# Patient Record
Sex: FEMALE | Race: BLACK OR AFRICAN AMERICAN | NOT HISPANIC OR LATINO | Employment: FULL TIME | URBAN - METROPOLITAN AREA
[De-identification: names, ages, dates, MRNs, and addresses within clinical notes are randomized per-mention and may not be internally consistent; named-entity substitution may affect disease eponyms.]

---

## 2024-06-14 ENCOUNTER — LAB VISIT (OUTPATIENT)
Dept: LAB | Facility: HOSPITAL | Age: 24
End: 2024-06-14
Attending: INTERNAL MEDICINE
Payer: COMMERCIAL

## 2024-06-14 ENCOUNTER — PATIENT MESSAGE (OUTPATIENT)
Dept: RHEUMATOLOGY | Facility: CLINIC | Age: 24
End: 2024-06-14

## 2024-06-14 ENCOUNTER — OFFICE VISIT (OUTPATIENT)
Dept: RHEUMATOLOGY | Facility: CLINIC | Age: 24
End: 2024-06-14
Payer: COMMERCIAL

## 2024-06-14 VITALS
BODY MASS INDEX: 47.7 KG/M2 | DIASTOLIC BLOOD PRESSURE: 73 MMHG | HEIGHT: 61 IN | HEART RATE: 69 BPM | WEIGHT: 252.63 LBS | SYSTOLIC BLOOD PRESSURE: 105 MMHG

## 2024-06-14 DIAGNOSIS — R21 RASH: Primary | ICD-10-CM

## 2024-06-14 DIAGNOSIS — R21 RASH: ICD-10-CM

## 2024-06-14 DIAGNOSIS — R76.8 POSITIVE ANA (ANTINUCLEAR ANTIBODY): ICD-10-CM

## 2024-06-14 LAB
ALBUMIN SERPL BCP-MCNC: 3.7 G/DL (ref 3.5–5.2)
ALP SERPL-CCNC: 90 U/L (ref 55–135)
ALT SERPL W/O P-5'-P-CCNC: 10 U/L (ref 10–44)
ANION GAP SERPL CALC-SCNC: 9 MMOL/L (ref 8–16)
AST SERPL-CCNC: 16 U/L (ref 10–40)
BASOPHILS # BLD AUTO: 0.05 K/UL (ref 0–0.2)
BASOPHILS NFR BLD: 0.8 % (ref 0–1.9)
BILIRUB SERPL-MCNC: 0.8 MG/DL (ref 0.1–1)
BUN SERPL-MCNC: 8 MG/DL (ref 6–20)
C3 SERPL-MCNC: 177 MG/DL (ref 50–180)
C4 SERPL-MCNC: 37 MG/DL (ref 11–44)
CALCIUM SERPL-MCNC: 9.2 MG/DL (ref 8.7–10.5)
CHLORIDE SERPL-SCNC: 106 MMOL/L (ref 95–110)
CO2 SERPL-SCNC: 23 MMOL/L (ref 23–29)
CREAT SERPL-MCNC: 0.8 MG/DL (ref 0.5–1.4)
DIFFERENTIAL METHOD BLD: NORMAL
EOSINOPHIL # BLD AUTO: 0.1 K/UL (ref 0–0.5)
EOSINOPHIL NFR BLD: 0.9 % (ref 0–8)
ERYTHROCYTE [DISTWIDTH] IN BLOOD BY AUTOMATED COUNT: 13.6 % (ref 11.5–14.5)
EST. GFR  (NO RACE VARIABLE): >60 ML/MIN/1.73 M^2
GLUCOSE SERPL-MCNC: 84 MG/DL (ref 70–110)
HCT VFR BLD AUTO: 37.9 % (ref 37–48.5)
HGB BLD-MCNC: 12.7 G/DL (ref 12–16)
IGE SERPL-ACNC: 58 IU/ML (ref 0–100)
IMM GRANULOCYTES # BLD AUTO: 0.02 K/UL (ref 0–0.04)
IMM GRANULOCYTES NFR BLD AUTO: 0.3 % (ref 0–0.5)
LYMPHOCYTES # BLD AUTO: 1.4 K/UL (ref 1–4.8)
LYMPHOCYTES NFR BLD: 22.6 % (ref 18–48)
MCH RBC QN AUTO: 27.7 PG (ref 27–31)
MCHC RBC AUTO-ENTMCNC: 33.5 G/DL (ref 32–36)
MCV RBC AUTO: 83 FL (ref 82–98)
MONOCYTES # BLD AUTO: 0.5 K/UL (ref 0.3–1)
MONOCYTES NFR BLD: 8 % (ref 4–15)
NEUTROPHILS # BLD AUTO: 4.3 K/UL (ref 1.8–7.7)
NEUTROPHILS NFR BLD: 67.4 % (ref 38–73)
NRBC BLD-RTO: 0 /100 WBC
PLATELET # BLD AUTO: 420 K/UL (ref 150–450)
PMV BLD AUTO: 10.1 FL (ref 9.2–12.9)
POTASSIUM SERPL-SCNC: 3.8 MMOL/L (ref 3.5–5.1)
PROT SERPL-MCNC: 7.4 G/DL (ref 6–8.4)
RBC # BLD AUTO: 4.58 M/UL (ref 4–5.4)
SODIUM SERPL-SCNC: 138 MMOL/L (ref 136–145)
WBC # BLD AUTO: 6.38 K/UL (ref 3.9–12.7)

## 2024-06-14 PROCEDURE — 86162 COMPLEMENT TOTAL (CH50): CPT | Performed by: INTERNAL MEDICINE

## 2024-06-14 PROCEDURE — 86036 ANCA SCREEN EACH ANTIBODY: CPT | Performed by: INTERNAL MEDICINE

## 2024-06-14 PROCEDURE — 3008F BODY MASS INDEX DOCD: CPT | Mod: CPTII,S$GLB,, | Performed by: INTERNAL MEDICINE

## 2024-06-14 PROCEDURE — 82785 ASSAY OF IGE: CPT | Performed by: INTERNAL MEDICINE

## 2024-06-14 PROCEDURE — 86160 COMPLEMENT ANTIGEN: CPT | Performed by: INTERNAL MEDICINE

## 2024-06-14 PROCEDURE — 86038 ANTINUCLEAR ANTIBODIES: CPT | Performed by: INTERNAL MEDICINE

## 2024-06-14 PROCEDURE — 85025 COMPLETE CBC W/AUTO DIFF WBC: CPT | Performed by: INTERNAL MEDICINE

## 2024-06-14 PROCEDURE — 3074F SYST BP LT 130 MM HG: CPT | Mod: CPTII,S$GLB,, | Performed by: INTERNAL MEDICINE

## 2024-06-14 PROCEDURE — 1159F MED LIST DOCD IN RCRD: CPT | Mod: CPTII,S$GLB,, | Performed by: INTERNAL MEDICINE

## 2024-06-14 PROCEDURE — 86160 COMPLEMENT ANTIGEN: CPT | Mod: 59 | Performed by: INTERNAL MEDICINE

## 2024-06-14 PROCEDURE — 1160F RVW MEDS BY RX/DR IN RCRD: CPT | Mod: CPTII,S$GLB,, | Performed by: INTERNAL MEDICINE

## 2024-06-14 PROCEDURE — 80053 COMPREHEN METABOLIC PANEL: CPT | Performed by: INTERNAL MEDICINE

## 2024-06-14 PROCEDURE — 99999 PR PBB SHADOW E&M-NEW PATIENT-LVL III: CPT | Mod: PBBFAC,,, | Performed by: INTERNAL MEDICINE

## 2024-06-14 PROCEDURE — 99204 OFFICE O/P NEW MOD 45 MIN: CPT | Mod: S$GLB,,, | Performed by: INTERNAL MEDICINE

## 2024-06-14 PROCEDURE — 3078F DIAST BP <80 MM HG: CPT | Mod: CPTII,S$GLB,, | Performed by: INTERNAL MEDICINE

## 2024-06-16 NOTE — PROGRESS NOTES
History of present illness:  23-year-old female has a 7 year history of rash.  It is been primarily on the back of the neck and her chest.  It is also been on her arms.  She describes it as raised macules.  It is pruritic.  It may last up to a month at a time.  She has been treated with light therapy and topical medications.  The rash is worse when she has in the sun.  She has never had a skin biopsy.  Most recently she was found to have a positive GEOVANNI and is referred for that reason.    She has had no unexplained fevers.  She complains of bifrontal headache.  She has had no conjunctivitis, oral ulcers, dry eye or mouth, Raynaud's phenomena.  She is occasional anterior pleuritic chest pain.  She has no chronic or bloody diarrhea.  She complains of chronic vaginal discharge.  She has had no vaginal ulcers.  She has no joint pain or arthritis, numbness or tingling, thrombophlebitis.  She has never been pregnant.  She has no family history of connective tissue disease.    Systems review:  General:  She is lost 14 lb on diet   Respiratory:  No chronic cough or shortness of breath.  She has a history of asthma and occasional wheezing.  She uses an inhaler but has been on no preventative medication.  GI:  No abdominal pain or peptic ulcer disease.  No liver problems.  :  No kidney or bladder problems    Physical examination:  Skin:  She has no rash at this time.  (There are pictures of her previous rash in media).  ENT:  Adequate tears in saliva.  No conjunctivitis or oral ulcers.  Chest: Clear to auscultation  Cardiac:  No murmurs, gallops, rubs   Abdomen:  No organomegaly or masses.  No tenderness to palpation   Extremities:  No pedal edema  Musculoskeletal:  Full range of motion of all joints.  No synovitis.  Neurologic:  Normal muscle strength testing   Laboratory: GEOVANNI positive 1:40, cytoplasmic pattern.  This is considered negative in most laboratory.  Her other laboratory studies were negative.       Assessment:  1.  Nonspecific rash  2. The GEOVANNI is not clinically significant     Plans:  1. I have ordered further laboratory studies   2. I told her she needs a skin biopsy  3. I did not give her regular return appointment.  This depends on the laboratory studies.      Answers submitted by the patient for this visit:  Rheumatology Questionnaire (Submitted on 6/7/2024)  fever: No  eye redness: No  mouth sores: No  headaches: Yes  shortness of breath: Yes  chest pain: No  trouble swallowing: No  diarrhea: No  constipation: No  unexpected weight change: Yes  genital sore: No  dysuria: No  During the last 3 days, have you had a skin rash?: No  Bruises or bleeds easily: Yes  cough: No

## 2024-06-17 LAB
ANA SER QL IF: NORMAL
ANCA AB TITR SER IF: NORMAL TITER
CH50 SERPL-ACNC: >97 U/ML (ref 42–95)
P-ANCA TITR SER IF: NORMAL TITER

## 2024-12-22 ENCOUNTER — HOSPITAL ENCOUNTER (EMERGENCY)
Facility: OTHER | Age: 24
Discharge: HOME | End: 2024-12-22
Attending: EMERGENCY MEDICINE
Payer: COMMERCIAL

## 2024-12-22 VITALS
TEMPERATURE: 99 F | BODY MASS INDEX: 47.2 KG/M2 | SYSTOLIC BLOOD PRESSURE: 146 MMHG | WEIGHT: 250 LBS | HEIGHT: 61 IN | RESPIRATION RATE: 16 BRPM | HEART RATE: 111 BPM | DIASTOLIC BLOOD PRESSURE: 65 MMHG | OXYGEN SATURATION: 98 %

## 2024-12-22 DIAGNOSIS — J11.1 INFLUENZA: Primary | ICD-10-CM

## 2024-12-22 LAB
CTP QC/QA: YES
CTP QC/QA: YES
POC MOLECULAR INFLUENZA A AGN: POSITIVE
POC MOLECULAR INFLUENZA B AGN: NEGATIVE
SARS-COV-2 RDRP RESP QL NAA+PROBE: NEGATIVE

## 2024-12-22 PROCEDURE — 99283 EMERGENCY DEPT VISIT LOW MDM: CPT

## 2024-12-22 PROCEDURE — 87635 SARS-COV-2 COVID-19 AMP PRB: CPT | Performed by: EMERGENCY MEDICINE

## 2024-12-22 RX ORDER — BENZONATATE 100 MG/1
100 CAPSULE ORAL 3 TIMES DAILY PRN
Qty: 20 CAPSULE | Refills: 0 | Status: SHIPPED | OUTPATIENT
Start: 2024-12-22 | End: 2025-01-01

## 2024-12-22 NOTE — ED NOTES
Viky Loya, an 24 y.o. female presents to the ED via POV  [id number] p/w/d ambulated into the ED at her own accord  C//O a headache, congestion and productive cough over the past week  Pt reports greenish colored mucous production   OTC meds attempted but still no relief           (-)CP, SOB, Abd pain, dizziness, weakness or NVD  (-)Neuro deficits  (-)fever      Chief Complaint   Patient presents with    COVID-19 Concerns     Productive cough, congestion and headache X1 week     Review of patient's allergies indicates:  No Known Allergies  History reviewed. No pertinent past medical history.

## 2024-12-22 NOTE — Clinical Note
"Viky"Raymond Loya was seen and treated in our emergency department on 12/22/2024.  She may return to work on 12/26/2024.       If you have any questions or concerns, please don't hesitate to call.      Seb Hanks., DO"

## 2024-12-22 NOTE — ED PROVIDER NOTES
"     Source of History:  The patient    Chief complaint:  COVID-19 Concerns (Productive cough, congestion and headache X1 week)      HPI:  Viky Loya is a 24 y.o. female  complain of cough, congestion headache for the last 2-3 days.  Denies any fevers or shortness of breath.    This is the extent to the patients complaints today here in the emergency department.    ROS:   See HPI.    Review of patient's allergies indicates:  No Known Allergies    PMH:  As per HPI and below:  History reviewed. No pertinent past medical history.  History reviewed. No pertinent surgical history.    Social History     Tobacco Use    Smoking status: Never    Smokeless tobacco: Never   Substance Use Topics    Alcohol use: Never       Physical Exam:    BP (!) 146/65 (BP Location: Left arm)   Pulse (!) 111   Temp 98.6 °F (37 °C) (Oral)   Resp 16   Ht 5' 1" (1.549 m)   Wt 113.4 kg (250 lb)   LMP  (LMP Unknown)   SpO2 98%   Breastfeeding No   BMI 47.24 kg/m²   Nursing note and vital signs reviewed.  Appearance:  No acute distress.  Comfortable.  Head: Normocephalic.  Atraumatic.    Chest/Respiratory: Normal work of breathing.  No retractions.  No stridor.  Lungs are clear bilaterally without any wheezing rhonchi or rales  Cardiovascular: Regular rhythm.  No appearance of shock.  No edema.  Mental Status:  Alert and oriented x 3.  Appropriate, conversant.         MDM:    Patient has signs and symptoms viral upper respiratory infection/viral syndrome. Vital signs are not concerning at this time.  The patient is not hypoxic and has no respiratory distress.  Do not suspect sepsis or pneumonia.   Will test for COVID as well as influenza.    ED Course as of 12/22/24 0710   Sun Dec 22, 2024   0710 POC Molecular Influenza A Ag(!): Positive [SM]   0710 No further workup is indicated in the emergency department today.  I updated pt regarding results and I counseled pt regarding supportive care measures.  Diagnosis and treatment plan explained " to patient. I have answered all questions and the patient is satisfied with the plan of care. Patient discharged home in stable condition.  [SM]      ED Course User Index  [SM] Seb Hanks DO                 Diagnostic Impression:    1. Influenza         ED Disposition Condition    Discharge Stable            ED Prescriptions       Medication Sig Dispense Start Date End Date Auth. Provider    benzonatate (TESSALON) 100 MG capsule Take 1 capsule (100 mg total) by mouth 3 (three) times daily as needed. 20 capsule 12/22/2024 1/1/2025 Seb Hanks,           Follow-up Information    None          Seb Hanks,   12/22/24 0710

## 2024-12-22 NOTE — DISCHARGE INSTRUCTIONS
May also use Afrin (oxymetazoline) over-the-counter.  The store brand generic is just as good and half the price of the brand name.  Use before bedtime.  Use no more than 3 days.    Use Tylenol or ibuprofen as needed for body aches or fevers or headache.